# Patient Record
Sex: MALE | Race: WHITE | Employment: STUDENT | URBAN - METROPOLITAN AREA
[De-identification: names, ages, dates, MRNs, and addresses within clinical notes are randomized per-mention and may not be internally consistent; named-entity substitution may affect disease eponyms.]

---

## 2019-09-27 ENCOUNTER — HOSPITAL ENCOUNTER (INPATIENT)
Facility: CLINIC | Age: 19
LOS: 1 days | Discharge: HOME | DRG: 918 | End: 2019-09-28
Attending: EMERGENCY MEDICINE | Admitting: FAMILY MEDICINE
Payer: COMMERCIAL

## 2019-09-27 DIAGNOSIS — T39.1X2A INTENTIONAL ACETAMINOPHEN OVERDOSE, INITIAL ENCOUNTER (H): ICD-10-CM

## 2019-09-27 DIAGNOSIS — R11.0 NAUSEA: ICD-10-CM

## 2019-09-27 DIAGNOSIS — T14.91XA SUICIDE ATTEMPT (H): ICD-10-CM

## 2019-09-27 LAB
ALBUMIN SERPL-MCNC: 3.9 G/DL (ref 3.4–5)
ALBUMIN SERPL-MCNC: 4.1 G/DL (ref 3.4–5)
ALP SERPL-CCNC: 84 U/L (ref 40–150)
ALP SERPL-CCNC: 93 U/L (ref 40–150)
ALT SERPL W P-5'-P-CCNC: 23 U/L (ref 0–70)
ALT SERPL W P-5'-P-CCNC: 25 U/L (ref 0–70)
AMPHETAMINES UR QL SCN: NEGATIVE
ANION GAP SERPL CALCULATED.3IONS-SCNC: 10 MMOL/L (ref 3–14)
ANION GAP SERPL CALCULATED.3IONS-SCNC: 4 MMOL/L (ref 3–14)
APAP SERPL-MCNC: 142.9 UG/ML (ref 10–30)
APAP SERPL-MCNC: NORMAL MG/L (ref 10–20)
AST SERPL W P-5'-P-CCNC: 12 U/L (ref 0–45)
AST SERPL W P-5'-P-CCNC: 8 U/L (ref 0–45)
BARBITURATES UR QL: NEGATIVE
BASOPHILS # BLD AUTO: 0 10E9/L (ref 0–0.2)
BASOPHILS NFR BLD AUTO: 0.5 %
BENZODIAZ UR QL: NEGATIVE
BILIRUB DIRECT SERPL-MCNC: 0.3 MG/DL (ref 0–0.2)
BILIRUB SERPL-MCNC: 2.2 MG/DL (ref 0.2–1.3)
BILIRUB SERPL-MCNC: 2.6 MG/DL (ref 0.2–1.3)
BUN SERPL-MCNC: 12 MG/DL (ref 7–30)
BUN SERPL-MCNC: 15 MG/DL (ref 7–30)
CALCIUM SERPL-MCNC: 9.2 MG/DL (ref 8.5–10.1)
CALCIUM SERPL-MCNC: 9.2 MG/DL (ref 8.5–10.1)
CANNABINOIDS UR QL SCN: NEGATIVE
CHLORIDE SERPL-SCNC: 107 MMOL/L (ref 94–109)
CHLORIDE SERPL-SCNC: 107 MMOL/L (ref 94–109)
CO2 SERPL-SCNC: 24 MMOL/L (ref 20–32)
CO2 SERPL-SCNC: 29 MMOL/L (ref 20–32)
COCAINE UR QL: NEGATIVE
CREAT SERPL-MCNC: 0.82 MG/DL (ref 0.66–1.25)
CREAT SERPL-MCNC: 0.86 MG/DL (ref 0.66–1.25)
DIFFERENTIAL METHOD BLD: NORMAL
EOSINOPHIL # BLD AUTO: 0.1 10E9/L (ref 0–0.7)
EOSINOPHIL NFR BLD AUTO: 1.2 %
ERYTHROCYTE [DISTWIDTH] IN BLOOD BY AUTOMATED COUNT: 12.5 % (ref 10–15)
ETHANOL SERPL-MCNC: <0.01 G/DL
ETHANOL UR QL SCN: NEGATIVE
GFR SERPL CREATININE-BSD FRML MDRD: >90 ML/MIN/{1.73_M2}
GFR SERPL CREATININE-BSD FRML MDRD: >90 ML/MIN/{1.73_M2}
GLUCOSE SERPL-MCNC: 104 MG/DL (ref 70–99)
GLUCOSE SERPL-MCNC: 83 MG/DL (ref 70–99)
HCT VFR BLD AUTO: 46.2 % (ref 40–53)
HGB BLD-MCNC: 16.4 G/DL (ref 13.3–17.7)
IMM GRANULOCYTES # BLD: 0 10E9/L (ref 0–0.4)
IMM GRANULOCYTES NFR BLD: 0.2 %
LYMPHOCYTES # BLD AUTO: 1.8 10E9/L (ref 0.8–5.3)
LYMPHOCYTES NFR BLD AUTO: 27.7 %
MCH RBC QN AUTO: 32.7 PG (ref 26.5–33)
MCHC RBC AUTO-ENTMCNC: 35.5 G/DL (ref 31.5–36.5)
MCV RBC AUTO: 92 FL (ref 78–100)
MONOCYTES # BLD AUTO: 0.6 10E9/L (ref 0–1.3)
MONOCYTES NFR BLD AUTO: 9.6 %
NEUTROPHILS # BLD AUTO: 4 10E9/L (ref 1.6–8.3)
NEUTROPHILS NFR BLD AUTO: 60.8 %
NRBC # BLD AUTO: 0 10*3/UL
NRBC BLD AUTO-RTO: 0 /100
OPIATES UR QL SCN: NEGATIVE
PLATELET # BLD AUTO: 205 10E9/L (ref 150–450)
POTASSIUM SERPL-SCNC: 3.6 MMOL/L (ref 3.4–5.3)
POTASSIUM SERPL-SCNC: 4.1 MMOL/L (ref 3.4–5.3)
PROT SERPL-MCNC: 6.3 G/DL (ref 6.8–8.8)
PROT SERPL-MCNC: 6.4 G/DL (ref 6.8–8.8)
RBC # BLD AUTO: 5.02 10E12/L (ref 4.4–5.9)
SALICYLATES SERPL-MCNC: <2 MG/DL
SODIUM SERPL-SCNC: 140 MMOL/L (ref 133–144)
SODIUM SERPL-SCNC: 142 MMOL/L (ref 133–144)
WBC # BLD AUTO: 6.6 10E9/L (ref 4–11)

## 2019-09-27 PROCEDURE — 96366 THER/PROPH/DIAG IV INF ADDON: CPT | Performed by: EMERGENCY MEDICINE

## 2019-09-27 PROCEDURE — 25000128 H RX IP 250 OP 636: Performed by: EMERGENCY MEDICINE

## 2019-09-27 PROCEDURE — 80329 ANALGESICS NON-OPIOID 1 OR 2: CPT | Performed by: EMERGENCY MEDICINE

## 2019-09-27 PROCEDURE — 82248 BILIRUBIN DIRECT: CPT | Performed by: EMERGENCY MEDICINE

## 2019-09-27 PROCEDURE — 85025 COMPLETE CBC W/AUTO DIFF WBC: CPT | Performed by: EMERGENCY MEDICINE

## 2019-09-27 PROCEDURE — 25000131 ZZH RX MED GY IP 250 OP 636 PS 637: Performed by: EMERGENCY MEDICINE

## 2019-09-27 PROCEDURE — 99284 EMERGENCY DEPT VISIT MOD MDM: CPT | Mod: Z6 | Performed by: EMERGENCY MEDICINE

## 2019-09-27 PROCEDURE — 93005 ELECTROCARDIOGRAM TRACING: CPT | Performed by: EMERGENCY MEDICINE

## 2019-09-27 PROCEDURE — 99285 EMERGENCY DEPT VISIT HI MDM: CPT | Mod: 25 | Performed by: EMERGENCY MEDICINE

## 2019-09-27 PROCEDURE — 25800030 ZZH RX IP 258 OP 636: Performed by: EMERGENCY MEDICINE

## 2019-09-27 PROCEDURE — 80053 COMPREHEN METABOLIC PANEL: CPT | Performed by: FAMILY MEDICINE

## 2019-09-27 PROCEDURE — 12000001 ZZH R&B MED SURG/OB UMMC

## 2019-09-27 PROCEDURE — 80320 DRUG SCREEN QUANTALCOHOLS: CPT | Performed by: STUDENT IN AN ORGANIZED HEALTH CARE EDUCATION/TRAINING PROGRAM

## 2019-09-27 PROCEDURE — 36415 COLL VENOUS BLD VENIPUNCTURE: CPT | Performed by: FAMILY MEDICINE

## 2019-09-27 PROCEDURE — 80307 DRUG TEST PRSMV CHEM ANLYZR: CPT | Performed by: STUDENT IN AN ORGANIZED HEALTH CARE EDUCATION/TRAINING PROGRAM

## 2019-09-27 PROCEDURE — 80053 COMPREHEN METABOLIC PANEL: CPT | Performed by: EMERGENCY MEDICINE

## 2019-09-27 PROCEDURE — 80320 DRUG SCREEN QUANTALCOHOLS: CPT | Performed by: EMERGENCY MEDICINE

## 2019-09-27 PROCEDURE — 96365 THER/PROPH/DIAG IV INF INIT: CPT | Performed by: EMERGENCY MEDICINE

## 2019-09-27 RX ORDER — ONDANSETRON 2 MG/ML
4 INJECTION INTRAMUSCULAR; INTRAVENOUS ONCE
Status: DISCONTINUED | OUTPATIENT
Start: 2019-09-27 | End: 2019-09-27

## 2019-09-27 RX ORDER — ONDANSETRON 4 MG/1
4 TABLET, ORALLY DISINTEGRATING ORAL ONCE
Status: COMPLETED | OUTPATIENT
Start: 2019-09-27 | End: 2019-09-27

## 2019-09-27 RX ORDER — ONDANSETRON 8 MG/1
8 TABLET, ORALLY DISINTEGRATING ORAL ONCE
Status: COMPLETED | OUTPATIENT
Start: 2019-09-27 | End: 2019-09-27

## 2019-09-27 RX ORDER — LIDOCAINE 40 MG/G
CREAM TOPICAL
Status: DISCONTINUED | OUTPATIENT
Start: 2019-09-27 | End: 2019-09-28 | Stop reason: HOSPADM

## 2019-09-27 RX ORDER — NALOXONE HYDROCHLORIDE 0.4 MG/ML
.1-.4 INJECTION, SOLUTION INTRAMUSCULAR; INTRAVENOUS; SUBCUTANEOUS
Status: DISCONTINUED | OUTPATIENT
Start: 2019-09-27 | End: 2019-09-28 | Stop reason: HOSPADM

## 2019-09-27 RX ADMIN — ACETYLCYSTEINE 10.72 G: 200 INJECTION, SOLUTION INTRAVENOUS at 07:27

## 2019-09-27 RX ADMIN — ACETYLCYSTEINE 3.58 G: 200 INJECTION, SOLUTION INTRAVENOUS at 08:28

## 2019-09-27 RX ADMIN — ACETYLCYSTEINE 7.16 G: 200 INJECTION, SOLUTION INTRAVENOUS at 16:39

## 2019-09-27 RX ADMIN — ONDANSETRON 4 MG: 4 TABLET, ORALLY DISINTEGRATING ORAL at 11:08

## 2019-09-27 RX ADMIN — ONDANSETRON 8 MG: 8 TABLET, ORALLY DISINTEGRATING ORAL at 08:34

## 2019-09-27 ASSESSMENT — ENCOUNTER SYMPTOMS
VOMITING: 0
NECK STIFFNESS: 0
COLOR CHANGE: 0
SHORTNESS OF BREATH: 0
CONSTIPATION: 0
CONFUSION: 0
EYE REDNESS: 0
NAUSEA: 1
NAUSEA: 0
ABDOMINAL PAIN: 0
DIARRHEA: 0
CHILLS: 0
DIZZINESS: 0
ARTHRALGIAS: 0
BACK PAIN: 0
DIFFICULTY URINATING: 0
DYSURIA: 0
FEVER: 0
HEADACHES: 0

## 2019-09-27 ASSESSMENT — MIFFLIN-ST. JEOR: SCORE: 1797.34

## 2019-09-27 NOTE — ED NOTES
[facility], [place]   ED Nurse to Floor Handoff     Bennett K Blumberg is a 18 year old male who speaks English and lives alone,  in a home  They arrived in the ED by ambulance from home    ED Chief Complaint: Suicide Attempt    ED Dx;   Final diagnoses:   Intentional acetaminophen overdose, initial encounter (H)   Suicide attempt (H)         Needed?: No    Allergies: No Known Allergies.  Past Medical Hx:   Past Medical History:   Diagnosis Date     Concussion       Baseline Mental status: WDL  Current Mental Status changes: at basesline    Infection present or suspected this encounter: no  Sepsis suspected: No  Isolation type: No active isolations     Activity level - Baseline/Home:  Independent  Activity Level - Current:   Stand with Assist    Bariatric equipment needed?: No    In the ED these meds were given:   Medications   charcoal activated in water (ACTIDOSE AQUA) liquid 50 g (has no administration in time range)       Drips running?  No    Home pump  No    Current LDAs  Peripheral IV 09/27/19 Right Lower forearm (Active)   Site Assessment WDL 9/27/2019  2:25 AM   Line Status Saline locked 9/27/2019  2:25 AM   Phlebitis Scale 0-->no symptoms 9/27/2019  2:25 AM   Infiltration Scale 0 9/27/2019  2:25 AM   Number of days: 0       Labs results:   Labs Ordered and Resulted from Time of ED Arrival Up to the Time of Departure from the ED   COMPREHENSIVE METABOLIC PANEL - Abnormal; Notable for the following components:       Result Value    Bilirubin Total 2.2 (*)     Protein Total 6.4 (*)     All other components within normal limits   CBC WITH PLATELETS DIFFERENTIAL   SALICYLATE LEVEL   ALCOHOL ETHYL   DRUG ABUSE SCREEN 6 CHEM DEP URINE ([facility])       Imaging Studies: No results found for this or any previous visit (from the past 24 hour(s)).    Recent vital signs:   /75   Pulse 64   Temp 98.1  F (36.7  C) (Oral)   Resp 8   Wt 71.5 kg (157 lb 9.6 oz)   SpO2 100%      Bulverde Coma Scale Score: 15 (09/27/19 0224)       Cardiac Rhythm: Normal Sinus  Pt needs tele? No  Skin/wound Issues: None    Code Status: Full Code    Pain control: pt had none    Nausea control: pt had none    Abnormal labs/tests/findings requiring intervention: none    Family present during ED course? No   Family Comments/Social Situation comments: patient declined to call family at this time     Tasks needing completion: None    Loan Marvin, RN    [phone] [facility]

## 2019-09-27 NOTE — CONSULTS
"[facility], [place]  Psychiatry Consultation      Patient name: Bennett K Blumberg   MRN: [id number]    Age: 18 year old    YOB: 2000    Identifying information:   The patient is a 18 year old  male who is currently in the emergency room.    Reason for consult: Evaluate suicide risk and disposition recommendations following a suicide attempt via Tylenol overdose.    History of present illness:   The patient is a history of depression and anxiety who was brought to the emergency room by EMS after he called 911 reporting concern for his own well-being after overdosing on Tylenol while experiencing suicidal ideation.  The patient had reported taking approximately 25x500 mg tablets of Tylenol at around 1:00am.  He had arrived to the emergency room at around 2 AM and around 5:45 AM his acetaminophen levels were 142.9.  On arrival, he was no longer reporting suicidal ideation however did report suicidal intent when he overdosed on Tylenol.  As he was awaiting medical stabilization, psychiatry was consulted to evaluate his suicide risk and comment on disposition once medically stable.  On examination today, the patient openly admits that at the time of his overdose he was experiencing suicidal thoughts.  He identified an acute stressor involving a negative interaction with a female friend which triggered familiar emotions stemming from a significant breakup 8 months ago.  At that time, his girlfriend of 3 years had broken up with him which triggered severe depressive symptoms and 2 subsequent overdoses on ibuprofen, both occurring in the month of March.  He established with an outpatient therapist and engaged in what he refers to as \"neuro therapy\" however gained little benefit at coping with fears of abandonment and a general sense of insecurity since the breakup.  Over the past few months, he has engaged in casual dating however has noticed that he maintains a high " sensitivity to criticism and an ongoing fear of rejection.  When these fears are activated, he is reminded of significant relationship loss 8 months ago, which in turn triggers depressive symptoms and intense anxiety.  He admits to resorting to self-injurious behaviors to help distract from these intense emotions however has been able to abstain from doing so over the past several weeks.  Preceding this current overdose, he again spearing against a demise and a relationship with a female friend whom he has known briefly.  He explains that she blocked him on social media and suspects that she may be romantically involved with 1 of his friends.  Once again feeling rejected, he experienced an intensity of undesirable emotions which led to an impulsive overdose on 25 tablets of acetaminophen.  He explains that he had access to more if he so desired however was able to stop himself from continuing to take the tablets.  No longer desiring to commit suicide, he became worried about his well-being and contacted poison control to inquire if he should seek medical attention.  He was directed to do so and proceeded to call 911 which led to his arrival in the emergency room.  Today, he continues to deny suicidal ideation and is open to engaging in interventions that can help improve his mood and alleviate the intensity of his emotional reactions.  He plans to seek a new therapist to engage in a different therapy approach while pursuing psychological testing to assist in diagnostic testing.  After diagnostic testing, he would be open to psychotropic medication use.  Today, his treatment goals are to recover from his overdose and return back home to engage in outpatient treatment.  He plans to contact his mother to discuss what recently occurred so that he is well supported after returning home.    Psychiatric Review of Systems:    -- Depressive episode: Over the past 2 weeks, he reports the presence of mild depressive symptoms  along with some vegetative symptoms, identified as low energy and less than optimal concentration.  His sleep is fair.  Appetite is been normal.  He denied anhedonia and continues to desire spending time with friends and playing music.  -- Mar:  denies symptoms  -- Psychosis:  denies symptoms  -- Anxiety: He describes himself as a worrier, in excess of his peers, present for a number of years, and occasionally escalates to the point of panic.  No associated agoraphobia.  -- PTSD: denies symptoms  -- OCD: denies  symptoms  -- Eating disorder: denies symptoms    Psychiatric History:    He suspects that he is been depressed to a mild degree continuously for the past 5 years.  Symptoms intensified in March 2019 after a breakup with his girlfriend of 3 years.  He had 2 impulsive overdoses on ibuprofen that same month.  He was discharged home from the emergency room on both occasions.  This is his third suicide attempt utilizing a medication overdose.  He denied prior psychotropic medication use other than treatment of ADHD as a young teenager.  He has been seeing a therapist however is planning to establish with a different clinic to pursue a different mode of therapy.  He also reports a history of self-injurious behavior however not engaged in over the past few weeks.    Substance Use History:    Denies using illicit substances or alcohol corresponding to a diagnosis of abuse or dependence. No prior chemical dependency treatments reported.    Medical History: No active issues.      Current Facility-Administered Medications:      acetylcysteine (ACETADOTE) 10.72 g in sodium chloride 0.9 % 200 mL STEP ONE infusion, 0.15 g/kg, Intravenous, Continuous, Ponce Menjivar DO, Stopped at 09/27/19 0827     acetylcysteine (ACETADOTE) 3.58 g in sodium chloride 0.9 % 500 mL STEP TWO infusion, 0.05 g/kg, Intravenous, Continuous, Ponce Menjivar DO, Last Rate: 129.5 mL/hr at 09/27/19 0828, 3.58 g at 09/27/19 0828      "acetylcysteine (ACETADOTE) 7.16 g in sodium chloride 0.9 % 1,000 mL STEP THREE infusion, 0.1 g/kg, Intravenous, Continuous, Ponce Menjivar DO     charcoal activated in water (ACTIDOSE AQUA) liquid 50 g, 50 g, Oral, Once, Ponce Menjivar DO     lidocaine (LMX4) cream, , Topical, Q1H PRN, Jd Toledo MD     lidocaine 1 % 0.1-1 mL, 0.1-1 mL, Other, Q1H PRN, Jd Toledo MD     melatonin tablet 1 mg, 1 mg, Oral, At Bedtime PRN, Jd Toledo MD     naloxone (NARCAN) injection 0.1-0.4 mg, 0.1-0.4 mg, Intravenous, Q2 Min PRN, Jd Toledo MD     sodium chloride (PF) 0.9% PF flush 3 mL, 3 mL, Intracatheter, q1 min prn, Jd Toledo MD     sodium chloride (PF) 0.9% PF flush 3 mL, 3 mL, Intracatheter, Q8H, Jd Toledo MD  No current outpatient medications on file.     Social History:  Refer to the psychosocial assessment completed by the .  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  He reports having a good social support system with several close friends.  He describes himself as having a good report and relationship with his mother.  No active legal issues reported.     Family History:    He reports that his mother has been diagnosed with depression and has attended therapy as well no knowledge of bipolar disorder or suicides in the family.    Vital Signs:    B/P: 135/72, T: 98.1, P: 84, R: 21  Estimated body mass index is 16.06 kg/m  as calculated from the following:    Height as of 4/15/11: 1.499 m (4' 11\").    Weight as of 4/15/11: 36.1 kg (79 lb 8 oz).       Mental status examination:  Appearance:  Alert, fair hygiene, no acute distress  Attitude:  Attempts to be cooperative  Eye Contact: Fair  Mood:  Depressed  Affect: Mood congruent and blunted  Speech:  Normal rates, tone, latency, volume. Not pushed or pressured.  Psychomotor Behavior:  No psychomotor abnormalities noted  Thought Process: Linear and " logical; not tangential or circumstantial or disorganized  Associations:  Logical; no loose associations Noted  Thought Content:  No obvious paranoia, delusions, ideas of reference, or grandiosity noted. Denies auditory or visual hallucinations. Denies suicidal Ideations. Denies homicidal ideations.  Insight:  Fair  Judgment:  Fair  Oriented to:  time, person, and place  Attention Span and Concentration:  Intact  Recent and Remote Memory: Intact based on interviewing and details provided  Language: Appropriate based on interviewing  Fund of Knowledge: Appropriate based on interviewing  Muscle Strength and Tone: Normal upon visual inspection  Gait and Station: Normal upon visual inspection            Diagnoses:    Major depressive disorder - recurrent, moderate  Generalized anxiety disorder  Borderline personality features     Recommendations:  At this time, I do not feel that admission to the inpatient psychiatric unit would need to be forced however the patient would certainly benefit from the various therapies that could be offered in that setting if he were willing to voluntarily proceed with an admission.  At this time, he is declining voluntary admission in favor of pursuing outpatient treatment.  This would involve establishing with a new outpatient therapist while pursuing neuropsychological testing for diagnostic clarification.    The patient was educated regarding the role of psychotropic medications which were recommended to him, specifically an antidepressant.  He would like to obtain neuropsychological testing prior to initiating medication treatment.    He is currently in the process of awaiting medical stabilization and medical clearance following his recent acetaminophen overdose.  Once he is medically cleared, he may be discharged home.    If his clinical presentation were to change, please reconsult with psychiatry for a reassessment.

## 2019-09-27 NOTE — H&P
"[facility] Medicine History and Physical        Date of Admission:  9/27/2019  Date of Service: 9/27/2019       HPI     Chief Complaint   Tylenol ingestion    History is obtained from the patient and chart review.     History of Present Illness   Todd is a 18 year old male with hx of ibuprofen overdose x3 SI attempts 03/08/19, 03/30/19 and 03/31/19 presenting with suicide attempt with acetaminophen overdose. Patient around 1:30ish AM took 25 tablets of 500 mg tylenol. He did not take any other substance at that time per patient. He then called 911. Declined charcoal in ED. He declined to call his family as well. ED provider discussed case with poison control- needs 4 hour tylenol level. Based on that, then decide whether or not to use n-acetylcysteine. Patient reports \"initially regretting taking tylenol to hurt myself\" and then called 911. He did have SI. He feels safe now. Denies any symptoms. No chest pain, no abd pain, no fever/chills/n/v/d. No shortness of breath. No dizziness. No problems with urination or BMs.     Chart review shows patient denied SI when taking the ibuprofen previously. Chart review has listed borderline personality from one ED visit as well.     Patient remained HDS in ED other than bradycardia of 49. Refused charcoal. Ethyl alcohol within normal limits. Salicylate within normal limits. CBC totally normal. CMP showed high T bili at 2.2, Normal ALT/AST. Drug screen pending. Post ingestion 4 hour acetaminophen level pending.        History:   Review of Systems   Review of Systems   Constitutional: Negative for chills and fever.   Eyes: Negative for visual disturbance.   Respiratory: Negative for shortness of breath.    Cardiovascular: Negative for chest pain.   Gastrointestinal: Negative for abdominal pain, constipation, diarrhea, nausea and vomiting.   Genitourinary: Negative for difficulty urinating and dysuria.   Musculoskeletal: " Negative for back pain.   Skin: Negative for rash.   Neurological: Negative for dizziness and headaches.   Psychiatric/Behavioral: Positive for suicidal ideas. Negative for confusion.     Past Medical History    I have reviewed this patient's medical history and updated it with pertinent information if needed.   Past Medical History:   Diagnosis Date     Concussion    previous SI attempts     Past Surgical History   I have reviewed this patient's surgical history and updated it with pertinent information if needed.  Past Surgical History:   Procedure Laterality Date     APPENDECTOMY  3/2011     Social History   Social History     Tobacco Use     Smoking status: Never Smoker     Smokeless tobacco: Never Used   Substance Use Topics     Alcohol use: No     Drug use: No     Family History   I have reviewed this patient's family history and updated it with pertinent information if needed.   History reviewed. No pertinent family history.   Mom in chart review had mild depression resolved    Prior to Admission Medications   Prior to Admission Medications   Prescriptions Last Dose Informant Patient Reported? Taking?   NO ACTIVE MEDICATIONS   Yes No      Facility-Administered Medications: None     Allergies   No Known Allergies      Physical Exam    Vital Signs: Temp: 98.1  F (36.7  C) Temp src: Oral BP: 123/79 Pulse: 70 Heart Rate: 76 Resp: 10 SpO2: 98 % O2 Device: None (Room air)    Weight: 157 lbs 9.6 oz    Physical Exam  Vitals signs reviewed.   Constitutional:       General: He is not in acute distress.     Appearance: Normal appearance. He is normal weight.   HENT:      Head: Normocephalic and atraumatic.      Mouth/Throat:      Mouth: Mucous membranes are moist.   Eyes:      General: No scleral icterus.     Conjunctiva/sclera: Conjunctivae normal.   Neck:      Musculoskeletal: Neck supple.   Cardiovascular:      Rate and Rhythm: Normal rate and regular rhythm.      Pulses: Normal pulses.      Heart sounds: Normal  heart sounds. No murmur.   Pulmonary:      Effort: Pulmonary effort is normal. No respiratory distress.      Breath sounds: Normal breath sounds. No stridor. No wheezing or rhonchi.   Abdominal:      General: Abdomen is flat. Bowel sounds are normal. There is no distension.      Palpations: Abdomen is soft. There is no mass.   Musculoskeletal:         General: No swelling.   Skin:     General: Skin is warm and dry.      Capillary Refill: Capillary refill takes less than 2 seconds.   Neurological:      General: No focal deficit present.      Mental Status: He is alert and oriented to person, place, and time.   Psychiatric:         Mood and Affect: Affect normal.         Speech: Speech normal.         Thought Content: Thought content does not include suicidal (denies currently) ideation. Thought content does not include suicidal plan.         Cognition and Memory: Cognition normal.       Assessment & Plan    Todd is a 18 year old male admitted on 9/27/2019 for attempted suicide with tylenol overdose at 1 AM 09/27/19 - 25 500 mg tablets. Has history of 3 recent suicide attempts with ibuprofen in March 2019.     #suicide attempt  #Acetaminophen overdose  Agrees to be watched by medicine team. No active SI and says that he feels safe now. He seems to have talked this way during his past 3 suicide attempts as well. Psych inpatient to see today. Reports a history of therapy in the past. Has never seen psychiatry before but wants to and brought this up to me. He believes he needs medication. Initial ALT/ AST 25/12, Cr 0.8 and CO2 29. Discussed waiting periods and treatment for tylenol overdose.   -CMP noon and tomorrow, expect transaminitis then  -Poison control aware- I spoke with them and management depends on 4 hour tylenol level and call poison control [phone].   -Apparently tylenol lab level icteric and now will take 24 hours to get results, so ED provider informed me that he talked to poison control and they  also agreed to start NAC.   -NAC started, need to follow up with poison control for further plan   -1:1 sitter   -Psych inpatient consulted    # Pain Assessment:   Todd alvarez pain level was assessed and he currently denies pain.    Diet: Combination Diet Regular Diet Adult  Fluids: PO  DVT Prophylaxis: Pneumatic Compression Devices and Ambulate every shift  Code Status: Full Code    Disposition Plan   Expected discharge: 2 - 3 days; recommended to per psych recs once safe disposition plan/ TCU bed available and safely monitored tylenol overdose. Dispo:    Entered: Jd Toledo 09/27/2019, 7:36 AM   Information in the above section will display in the discharge planner report.    Discussed with faculty but not examined by faculty.  Jd Toledo  [facility]  [facility]   Pager: [phone]  Please see sticky note for cross cover information    Results:     Data   Recent Labs   Lab 09/27/19  0232   WBC 6.6   HGB 16.4   MCV 92         POTASSIUM 3.6   CHLORIDE 107   CO2 29   BUN 15   CR 0.86   ANIONGAP 4   HEIDI 9.2   GLC 83   ALBUMIN 4.1   PROTTOTAL 6.4*   BILITOTAL 2.2*   ALKPHOS 93   ALT 25   AST 12     No results found for this or any previous visit (from the past 24 hour(s)).

## 2019-09-27 NOTE — PHARMACY-ADMISSION MEDICATION HISTORY
Admission medication history interview status for the 9/27/2019 admission is complete. See Epic admission navigator for allergy information, pharmacy, prior to admission medications and immunization status.     Medication history interview sources:  Patient    Changes made to PTA medication list (reason)  Added:  None  Deleted: None  Changed: None    Additional medication history information (including reliability of information, actions taken by pharmacist):  - Reliability of information: moderate (patient states not taking any prescription or over-the-counter medications, however, patient overdosed on acetaminophen per chart review)  - After reviewing all online resources (Sure Scripts and Care Everywhere), unable to find any records of prescription medications. Patient reports not taking any prescription medications.      Prior to Admission medications    Not on File         Medication history completed by: Sara Pepper, Pharmacy Student

## 2019-09-27 NOTE — ED TRIAGE NOTES
Per EMs report patient took 25 500mg tylenol to harm himself. Took at 0100-0145am. Found out was harmful and called 911. VSS. BS 69 18 G R forearm

## 2019-09-27 NOTE — PROGRESS NOTES
"[facility], [facility] Progress Note - [facility]       Main Plans for Today   Continue NAC treatment  Repeat CMP, Tylenol level, INR 2 hour prior to step 3 NAC bag finishing.      Assessment & Plan   History of Present Illness   Todd is a 18 year old male with hx of ibuprofen overdose x3 SI attempts 03/08/19, 03/30/19 and 03/31/19 presenting with suicide attempt with acetaminophen overdose. Met criteria for NAC.     # #suicide attempt  #Acetaminophen overdose    Multiple prior attempts in impulsive manner all with ingestion. Ibuprofen all other attempts; this was his first with Tylenol because \"It was all we had in the cabinet\".  Patient overall denies depressive symptoms but has moments when he \"over reacts to shit things\" leading to him trying to overdose.  Immediate regret and then reaches out for help. All attempts stem from relationship trouble. Denies any physical, mental, or sexual abuse. No access to firearms in the home. Does have OTC medication easily available to him.     Psychiatry saw patient and do recommend inpatient psych treatment; however, patient wants outpatient regimen. Given his insight, his knowledge of depression and interest in therapy and mediation if neuro/psych confirms depression this is a reasonable alternative. Psychiatry states if patient did want to pursue therapy at discharge tomorrow 5mg lexapro could be started if his liver enzymes are WNL.     - Continue 3rd step NAC bag. Time CMP, INR, tylenol level 2 hours prior to bag completion. Notify poison control of these levels.   - Zofran 4mg IV/PO for nausea  -Follow up further psych recs  -Discuss medication one more time w patient prior to discharg    # Elevated Indirect Bilirubin   Developed after admission All other hepatic labs are normal. T bili increased to 2.2 > 2.6. Mostly indirect. Reviewed with posion control and unlikely a sign of worsening liver function. Tylenol also " doesn't typically cause hemolysis and his cell counts are stable. Potential that patient hs unknown Gilbert's diagnosis. Will continue to monitor with early AM CMP.     Diet: Combination Diet Regular Diet Adult  Fluids: PO in addition to NAC fluids  DVT Prophylaxis: Low Risk/Ambulatory with no VTE prophylaxis indicated  Code Status: Full Code    Disposition Plan   Expected discharge: Tomorrow, recommended to prior living arrangement once ocne completed NAC treatment and levels are reviewed. . Dispo: Expected Discharge Date: 09/28/19        Entered: Paul Vides 09/27/2019, 2:33 PM   Information in the above section will display in the discharge planner report.      The patient's care was discussed with the Attending Physician, Dr. Contreras .    Paul Vides  [facility]  Pager: [phone]  Please see sticky note for cross cover information    Interval History   Pt no longer suicidal. Mood is okay, he feels very embarrassed that the Tylenol required this much attention and treatment. With the ibuprofen he has been able to leave sooner. Mild nausea controleld w Zofran. Brief intermittent abd pain that improves with flatulence     Denies wanting inpatient psych to me but will talk with psychiatry more later. Denies depression but does have a history of it. No keira, no auditory or visual hallucinations.     Physical Exam   Vital Signs: Temp: 98.1  F (36.7  C) Temp src: Oral BP: 135/72 Pulse: 84 Heart Rate: 74 Resp: 21 SpO2: 100 % O2 Device: None (Room air)    Weight: 157 lbs 9.6 oz  General Appearance: Resting in dark room. In hospital gown, well kept   Respiratory: CTAB  Cardiovascular: RRR  GI: hyperactive bowels. No distention. Mildly tender over umbilicus  Skin: No icterus or rash

## 2019-09-27 NOTE — ED NOTES
Jose from pharmacy called sending acetadote to ED and verified the protocol has not been started at previous facility.

## 2019-09-28 VITALS
OXYGEN SATURATION: 98 % | WEIGHT: 153.22 LBS | DIASTOLIC BLOOD PRESSURE: 50 MMHG | SYSTOLIC BLOOD PRESSURE: 107 MMHG | RESPIRATION RATE: 18 BRPM | BODY MASS INDEX: 19.66 KG/M2 | TEMPERATURE: 97 F | HEART RATE: 62 BPM | HEIGHT: 74 IN

## 2019-09-28 LAB
ALBUMIN SERPL-MCNC: 3.6 G/DL (ref 3.4–5)
ALP SERPL-CCNC: 73 U/L (ref 40–150)
ALT SERPL W P-5'-P-CCNC: 24 U/L (ref 0–70)
ANION GAP SERPL CALCULATED.3IONS-SCNC: 7 MMOL/L (ref 3–14)
APAP SERPL-MCNC: 4 MG/L (ref 10–20)
AST SERPL W P-5'-P-CCNC: 12 U/L (ref 0–45)
BILIRUB SERPL-MCNC: 2.5 MG/DL (ref 0.2–1.3)
BUN SERPL-MCNC: 6 MG/DL (ref 7–30)
CALCIUM SERPL-MCNC: 9.2 MG/DL (ref 8.5–10.1)
CHLORIDE SERPL-SCNC: 109 MMOL/L (ref 94–109)
CO2 SERPL-SCNC: 27 MMOL/L (ref 20–32)
CREAT SERPL-MCNC: 0.79 MG/DL (ref 0.66–1.25)
ERYTHROCYTE [DISTWIDTH] IN BLOOD BY AUTOMATED COUNT: 12.6 % (ref 10–15)
GFR SERPL CREATININE-BSD FRML MDRD: >90 ML/MIN/{1.73_M2}
GLUCOSE SERPL-MCNC: 88 MG/DL (ref 70–99)
HCT VFR BLD AUTO: 45.4 % (ref 40–53)
HGB BLD-MCNC: 15.8 G/DL (ref 13.3–17.7)
INR PPP: 1.23 (ref 0.86–1.14)
INTERPRETATION ECG - MUSE: NORMAL
MCH RBC QN AUTO: 32.8 PG (ref 26.5–33)
MCHC RBC AUTO-ENTMCNC: 34.8 G/DL (ref 31.5–36.5)
MCV RBC AUTO: 94 FL (ref 78–100)
PLATELET # BLD AUTO: 175 10E9/L (ref 150–450)
POTASSIUM SERPL-SCNC: 4.2 MMOL/L (ref 3.4–5.3)
PROT SERPL-MCNC: 5.9 G/DL (ref 6.8–8.8)
RBC # BLD AUTO: 4.82 10E12/L (ref 4.4–5.9)
SODIUM SERPL-SCNC: 142 MMOL/L (ref 133–144)
WBC # BLD AUTO: 6.2 10E9/L (ref 4–11)

## 2019-09-28 PROCEDURE — 80053 COMPREHEN METABOLIC PANEL: CPT | Performed by: STUDENT IN AN ORGANIZED HEALTH CARE EDUCATION/TRAINING PROGRAM

## 2019-09-28 PROCEDURE — 85027 COMPLETE CBC AUTOMATED: CPT | Performed by: STUDENT IN AN ORGANIZED HEALTH CARE EDUCATION/TRAINING PROGRAM

## 2019-09-28 PROCEDURE — 85610 PROTHROMBIN TIME: CPT | Performed by: STUDENT IN AN ORGANIZED HEALTH CARE EDUCATION/TRAINING PROGRAM

## 2019-09-28 PROCEDURE — 80329 ANALGESICS NON-OPIOID 1 OR 2: CPT | Performed by: STUDENT IN AN ORGANIZED HEALTH CARE EDUCATION/TRAINING PROGRAM

## 2019-09-28 PROCEDURE — 25800030 ZZH RX IP 258 OP 636: Performed by: EMERGENCY MEDICINE

## 2019-09-28 PROCEDURE — 36415 COLL VENOUS BLD VENIPUNCTURE: CPT | Performed by: STUDENT IN AN ORGANIZED HEALTH CARE EDUCATION/TRAINING PROGRAM

## 2019-09-28 PROCEDURE — 25000128 H RX IP 250 OP 636: Performed by: EMERGENCY MEDICINE

## 2019-09-28 RX ADMIN — ACETYLCYSTEINE 7.16 G: 200 INJECTION, SOLUTION INTRAVENOUS at 08:47

## 2019-09-28 ASSESSMENT — ACTIVITIES OF DAILY LIVING (ADL)
ADLS_ACUITY_SCORE: 13

## 2019-09-28 ASSESSMENT — MIFFLIN-ST. JEOR: SCORE: 1774.75

## 2019-09-28 NOTE — PLAN OF CARE
Vital signs:  Temp: 97.8  F (36.6  C) Temp src: Oral BP: 115/68 Heart Rate: 61 Resp: 16 SpO2: 97 % O2 Device: None (Room air)       Neuro: A&Ox4, flat affect.   Cardiac: WDL, denies chest pain.   Respiratory: LS clear, denies SOB.   Pain: Pt appears to be resting & sleeping comfortably in bed, no c/o pain overnight.   Nausea: Denies nausea, no emesis.   GI/: Voiding, not saving. No BM this shift.   LDA: R PIV infusing Acetylcysteine drip.   Activity: Up ad mikaela.   New changes overnight: None, pt slept well overnight, no acute changes, no suicidal thoughts or suicidal behavior noted overnight.   Plan: Sitter at bedside. Continue POC.

## 2019-09-28 NOTE — DISCHARGE SUMMARY
"[facility], [facility] Discharge Summary- [place]  Service    Date of Admission:  9/27/2019  Date of Service: 9/28/2019  Date of Discharge:  9/28/2019  1:01 PM  Discharging Attending Provider: Diane  Discharge Team: [facility]    Discharge Diagnoses   Tylenol ingestion with intention for self-harm  History of Suicide Attempts     Follow-ups Needed After Discharge   Arrange for outpatient psychiatry for diagnostic evaluation and neuropsychiatric testing  PHQ 9 and chelsie 7 at hospital follow-up    Hospital Course   Bennett K Blumberg was admitted on 9/27/2019 for Tylenol ingestion  due to suicide attempt he has a history of multiple suicide attempts with medication ingestion, usually with ibuprofen this was his first with Tylenol the following problems were addressed during his hospitalization:    # Suicide attempt with medication ingestion   Tylenol overdose   history of suicide attempts      Multiple prior attempts in impulsive manner all with ingestion. Ibuprofen all other attempts; this was his first with Tylenol because \"It was all we had in the cabinet\".  Patient overall denies depressive symptoms but has moments when he over reacts leading to him  Overdosing .  Immediate regret and then reaches out for help. All attempts stem from relationship trouble. Denies any physical, mental, or sexual abuse. No access to firearms in the home. Does have OTC medication easily available to him.       Psychiatry saw patient and did recommend inpatient psych treatment; however, patient preferred to do this as an outpatient. At hospital follow up appointment would recommend arranging psychiatry, neuro psych testing, therapy. Psychiatry states if patient did want to pursue therapy at follow up 5mg lexapro could be started.     Pt given printed resources including text and phone call lines for suicide hot lines.     # isolated elevation in indirect bilirubin  Developed after admission All " other hepatic labs were normal. Max T bili 2.6. Mostly indirect. Reviewed with posion control and unlikely a sign of worsening liver function. Tylenol also doesn't typically cause hemolysis and hgb was  stable. Potential that patient hs unknown Gilbert's diagnosis. Pew chart review he has had isolated T bili before with prior ingestion attempts.     Consultations This Hospital Stay   PSYCHIATRY IP CONSULT  MEDICATION HISTORY IP PHARMACY CONSULT    Code Status   Full Code       The patient was discussed with Dr. Diane [facility] Inpatient [facility]  [facility]   Pager:[phone]_  ___________________________________________________________________  Review of Systems:  CONSTITUTIONAL: NEGATIVE for fever, chills, change in weight  INTEGUMENTARY/SKIN: NEGATIVE for worrisome rashes, moles or lesions  EYES: NEGATIVE for vision changes or irritation  RESP: NEGATIVE for significant cough or SOB  CV: NEGATIVE for chest pain, palpitations or peripheral edema  GI: NEGATIVE for nausea, abdominal pain, heartburn, or change in bowel habits  : NEGATIVE for frequency, dysuria, or hematuria  MUSCULOSKELETAL: NEGATIVE for significant arthralgias or myalgia  NEURO: NEGATIVE for weakness, dizziness or paresthesias  PSYCHIATRIC: NEGATIVE for changes in mood or affect. No thoughts of self harm or suicide.     Physical Exam   Vital Signs: Temp: 97  F (36.1  C) Temp src: Oral BP: 107/50 Pulse: 62 Heart Rate: 66 Resp: 18 SpO2: 98 % O2 Device: None (Room air)    Weight: 153 lbs 3.52 oz    General Appearance:  Resting in dark room. In hospital gown, well kept   Respiratory: CTAB  Cardiovascular: RRR  GI: hyperactive bowels. No distention. Mildly tender over umbilicus  Skin: No icterus or rash   Psych  Mood okay, congruent with affect. Normal speech pattern. Relatively poor eye contact. Appropriate dress. Denies thoughts of self harm, depression, anxiety.      Significant Results and Procedures    Most Recent 3 CBC's:  Recent Labs   Lab Test 09/28/19  0703 09/27/19  0232   WBC 6.2 6.6   HGB 15.8 16.4   MCV 94 92    205     Most Recent 3 BMP's:  Recent Labs   Lab Test 09/28/19  0703 09/27/19  1240 09/27/19  0232    142 140   POTASSIUM 4.2 4.1 3.6   CHLORIDE 109 107 107   CO2 27 24 29   BUN 6* 12 15   CR 0.79 0.82 0.86   ANIONGAP 7 10 4   HEIDI 9.2 9.2 9.2   GLC 88 104* 83     Most Recent 2 LFT's:  Recent Labs   Lab Test 09/28/19  0703 09/27/19  1240   AST 12 8   ALT 24 23   ALKPHOS 73 84   BILITOTAL 2.5* 2.6*         Primary Care Physician   Gm Eldridge    Discharge Disposition   Discharged to home  Condition at discharge: Stable    Discharge Orders      Reason for your hospital stay    Tylenol overdose     Follow Up and recommended labs and tests    Follow up with primary care provider, Gm Eldridge, within 7 days for hospital follow- up.  The following labs/tests are recommended: Refer to outpatient psychiatry for diagnostic eval and neuro/psych testing. PHQ9 and JAQUI 7.     Activity    Your activity upon discharge: activity as tolerated     Full Code     Diet    Follow this diet upon discharge: Orders Placed This Encounter      Combination Diet Regular Diet Adult     Discharge Medications   There are no discharge medications for this patient.    Allergies   No Known Allergies

## 2019-09-28 NOTE — PLAN OF CARE
Vitals:    09/27/19 2300 09/28/19 0433 09/28/19 0828 09/28/19 0846   BP: 115/68 104/50 107/50    BP Location: Left arm Left arm Left arm    Pulse:   62    Resp: 16 16 18    Temp: 97.8  F (36.6  C) 96.7  F (35.9  C) 97  F (36.1  C)    TempSrc: Oral Oral Oral    SpO2: 97% 96% 98%    Weight:    69.5 kg (153 lb 3.5 oz)   Height:        Afebrile vital stable, sating 98% on room air, lungs clear, +BS, denies any pain or nausea.  Denies any suicidal and disturbing thoughts. Sitter in the room at bed side,   Pt up independently, voiding not saved,  R PIV infusing Acetylcysteine drip, MD paged to conform about continuous drip, possible discharge home today, continue with plan of care.

## 2019-09-28 NOTE — PLAN OF CARE
Vitals:    09/27/19 2300 09/28/19 0433 09/28/19 0828 09/28/19 0846   BP: 115/68 104/50 107/50    BP Location: Left arm Left arm Left arm    Pulse:   62    Resp: 16 16 18    Temp: 97.8  F (36.6  C) 96.7  F (35.9  C) 97  F (36.1  C)    TempSrc: Oral Oral Oral    SpO2: 97% 96% 98%    Weight:    69.5 kg (153 lb 3.5 oz)   Height:        Discharge script written, teaching has been done. Pt discharged home.

## 2019-09-28 NOTE — DISCHARGE INSTRUCTIONS
Your emotional health is important to us!  If you feel that you may hurt yourself or others, please seek help through the hospital ER, or 9-1-1.  You may also call [place] [facility], toll-free, at 1.668.789.7595 for immediate support.''.suic

## 2019-09-29 ENCOUNTER — PATIENT OUTREACH (OUTPATIENT)
Dept: CARE COORDINATION | Facility: CLINIC | Age: 19
End: 2019-09-29

## 2019-09-30 NOTE — PROGRESS NOTES
[facility]: Post-Discharge Note  SITUATION                                                      Admission:    Admission Date: 09/27/19   Reason for Admission: Tylenol ingestion with intention for self-harm  Discharge:   Discharge Date: 09/28/19  Discharge Diagnosis: Tylenol ingestion with intention for self-harm  Discharge Service: Family Medicine     BACKGROUND                                                      Bennett K Blumberg was admitted on 9/27/2019 for Tylenol ingestion  due to suicide attempt he has a history of multiple suicide attempts with medication ingestion, usually with ibuprofen this was his first with Tylenol    ASSESSMENT      Discharge Assessment  Patient reports symptoms are: Unchanged  Does the patient have all of their medications?: Not Applicable  Does patient know what their new medications are for?: Not applicable  Does patient have a follow-up appointment scheduled?: No  Does patient have any other questions or concerns?: No    Post-op  Did the patient have surgery or a procedure: No  Fever: No  Chills: No  Eating & Drinking: eating and drinking without complaints/concerns  PO Intake: regular diet  Bowel Function: normal  Urinary Status: voiding without complaint/concerns    PLAN                                                      Outpatient Plan:      Follow up with primary care provider, Gm Eldridge, within 7 days for hospital follow- up.  The following labs/tests are recommended: Refer to outpatient psychiatry for diagnostic eval and neuro/psych testing. PHQ9 and JAQUI 7.    No future appointments.        Dolores Renee CMA